# Patient Record
Sex: MALE | Race: ASIAN | NOT HISPANIC OR LATINO | Employment: FULL TIME | URBAN - METROPOLITAN AREA
[De-identification: names, ages, dates, MRNs, and addresses within clinical notes are randomized per-mention and may not be internally consistent; named-entity substitution may affect disease eponyms.]

---

## 2017-01-03 ENCOUNTER — GENERIC CONVERSION - ENCOUNTER (OUTPATIENT)
Dept: OTHER | Facility: OTHER | Age: 47
End: 2017-01-03

## 2017-01-05 ENCOUNTER — ALLSCRIPTS OFFICE VISIT (OUTPATIENT)
Dept: OTHER | Facility: OTHER | Age: 47
End: 2017-01-05

## 2017-01-06 ENCOUNTER — LAB CONVERSION - ENCOUNTER (OUTPATIENT)
Dept: OTHER | Facility: OTHER | Age: 47
End: 2017-01-06

## 2017-01-06 LAB
A/G RATIO (HISTORICAL): 1.6 (CALC) (ref 1–2.5)
ALBUMIN SERPL BCP-MCNC: 4.7 G/DL (ref 3.6–5.1)
ALP SERPL-CCNC: 51 U/L (ref 40–115)
ALT SERPL W P-5'-P-CCNC: 26 U/L (ref 9–46)
AST SERPL W P-5'-P-CCNC: 25 U/L (ref 10–40)
BASOPHILS # BLD AUTO: 0.4 %
BASOPHILS # BLD AUTO: 20 CELLS/UL (ref 0–200)
BILIRUB SERPL-MCNC: 0.6 MG/DL (ref 0.2–1.2)
BUN SERPL-MCNC: 19 MG/DL (ref 7–25)
BUN/CREA RATIO (HISTORICAL): NORMAL (CALC) (ref 6–22)
CALCIUM SERPL-MCNC: 9.3 MG/DL (ref 8.6–10.3)
CHLORIDE SERPL-SCNC: 100 MMOL/L (ref 98–110)
CHOLEST SERPL-MCNC: 295 MG/DL (ref 125–200)
CHOLEST/HDLC SERPL: 4.6 (CALC)
CO2 SERPL-SCNC: 25 MMOL/L (ref 20–31)
CREAT SERPL-MCNC: 1.08 MG/DL (ref 0.6–1.35)
DEPRECATED RDW RBC AUTO: 12.1 % (ref 11–15)
EGFR AFRICAN AMERICAN (HISTORICAL): 95 ML/MIN/1.73M2
EGFR-AMERICAN CALC (HISTORICAL): 82 ML/MIN/1.73M2
EOSINOPHIL # BLD AUTO: 145 CELLS/UL (ref 15–500)
EOSINOPHIL # BLD AUTO: 2.9 %
FOLATE SERPL-MCNC: 20.9 NG/ML
GAMMA GLOBULIN (HISTORICAL): 3 G/DL (CALC) (ref 1.9–3.7)
GLUCOSE (HISTORICAL): 81 MG/DL (ref 65–99)
HCT VFR BLD AUTO: 47.3 % (ref 38.5–50)
HDLC SERPL-MCNC: 64 MG/DL
HGB BLD-MCNC: 15.8 G/DL (ref 13.2–17.1)
LDL CHOLESTEROL (HISTORICAL): 200 MG/DL (CALC)
LYMPHOCYTES # BLD AUTO: 1855 CELLS/UL (ref 850–3900)
LYMPHOCYTES # BLD AUTO: 37.1 %
MCH RBC QN AUTO: 32.3 PG (ref 27–33)
MCHC RBC AUTO-ENTMCNC: 33.3 G/DL (ref 32–36)
MCV RBC AUTO: 97 FL (ref 80–100)
MONOCYTES # BLD AUTO: 445 CELLS/UL (ref 200–950)
MONOCYTES (HISTORICAL): 8.9 %
NEUTROPHILS # BLD AUTO: 2535 CELLS/UL (ref 1500–7800)
NEUTROPHILS # BLD AUTO: 50.7 %
NON-HDL-CHOL (CHOL-HDL) (HISTORICAL): 231 MG/DL (CALC)
PLATELET # BLD AUTO: 215 THOUSAND/UL (ref 140–400)
PMV BLD AUTO: 9.1 FL (ref 7.5–11.5)
POTASSIUM SERPL-SCNC: 4 MMOL/L (ref 3.5–5.3)
RBC # BLD AUTO: 4.88 MILLION/UL (ref 4.2–5.8)
SODIUM SERPL-SCNC: 137 MMOL/L (ref 135–146)
T4 FREE SERPL-MCNC: 1.3 NG/DL (ref 0.8–1.8)
TOTAL PROTEIN (HISTORICAL): 7.7 G/DL (ref 6.1–8.1)
TRIGL SERPL-MCNC: 154 MG/DL
TSH SERPL DL<=0.05 MIU/L-ACNC: 3.43 MIU/L (ref 0.4–4.5)
VIT B12 SERPL-MCNC: 580 PG/ML (ref 200–1100)
WBC # BLD AUTO: 5 THOUSAND/UL (ref 3.8–10.8)

## 2017-02-06 ENCOUNTER — ALLSCRIPTS OFFICE VISIT (OUTPATIENT)
Dept: OTHER | Facility: OTHER | Age: 47
End: 2017-02-06

## 2018-01-11 NOTE — PROGRESS NOTES
Assessment    1  Allergic rhinitis, unspecified allergic rhinitis trigger, unspecified rhinitis seasonality (477 9)   (J30 9)   2  Palpitations (785 1) (R00 2)   3  Anxiety (300 00) (F41 9)   4  Hyperlipidemia (272 4) (E78 5)   5  Encounter for preventive health examination (V70 0) (Z00 00)    Plan  Seasonal allergic rhinitis, unspecified allergic rhinitis trigger    · Fluticasone Propionate 50 MCG/ACT Nasal Suspension; USE 2 SPRAYS IN EACH  NOSTRIL ONCE DAILY    Discussion/Summary  Impression: health maintenance visit  Currently, he eats a healthy diet, eats an adequate diet and has an adequate exercise regimen  Prostate cancer screening: PSA is not indicated  Testicular cancer screening: clinical testicular exam was done today  Colorectal cancer screening: colorectal cancer screening is not indicated  Screening lab work includes glucose and lipid profile  Patient discussion: discussed with the patient  Discussed blood test results  Patient to try Fluticasone NS, and continue OTC Claritin for allergies  Discussed stress and stress management, info given on recommendations  RTO after repeating blood test in 6 months, or sooner as needed  Self Referrals: No      Chief Complaint  Pt here for HM today  No depression sx--form ordered  dk    nsr since last here  History of Present Illness  HPI: 55year old  male presents for PE and to discuss blood test results  Dad had very high cholesterol  Review of Systems    Constitutional: no fever, not feeling poorly, no chills and not feeling tired  Eyes: no eyesight problems  ENT: no earache, no sore throat and no nasal discharge  Cardiovascular: palpitations, but no chest pain  Respiratory: shortness of breath, but no cough  Gastrointestinal: no abdominal pain, no nausea, no vomiting, no constipation, no diarrhea and no blood in stools  Musculoskeletal: no arthralgias and no myalgias     Neurological: no headache, no numbness, no tingling, no dizziness and no fainting  Psychiatric: anxiety and Able to control stress  , but not suicidal, no sleep disturbances and no depression  Over the past 2 weeks, how often have you been bothered by the following problems? 1 ) Little interest or pleasure in doing things? Not at all    2 ) Feeling down, depressed or hopeless? Not at all    3 ) Trouble falling asleep or sleeping too much? Half the days or more  4 ) Feeling tired or having little energy? Not at all    5 ) Poor appetite or overeating? Half the days or more  6 ) Feeling bad about yourself, or that you are a failure, or have let yourself or your family down? Several days  7 ) Trouble concentrating on things, such as reading a newspaper or watching television? Not at all    8 ) Moving or speaking so slowly that other people could have noticed, or the opposite, moving or speaking faster than usual? Not at all    9 ) Thoughts that you would be better off dead or of hurting yourself in some way? Not at all  severity of depression is mild   Score 5      Active Problems    1  Myopathy (359 9) (G72 9)   2  Palpitations (785 1) (R00 2)   3  Right shoulder pain (719 41) (M25 511)    Past Medical History    · History of hyperlipidemia (V12 29) (Z86 39)    Surgical History    · History of Elbow Surgery    Family History  Mother    · Family history unremarkable  Father    · Family history unremarkable    Social History    · Alcohol use (V49 89) (Z78 9)   · Current every day smoker (305 1) (F17 200)    Current Meds   1  Claritin 10 MG Oral Tablet; TAKE 1 TABLET DAILY; Therapy: (Recorded:[id number]) to Recorded    Allergies    1  No Known Drug Allergies    2  Pollen   3   Trees    Vitals   Recorded: [id number] 03:48PM   Heart Rate 68   Respiration 16   Systolic 267, LUE, Sitting   Diastolic 78, LUE, Sitting   Height 5 ft 5 in   Weight 148 lb    BMI Calculated 24 63   BSA Calculated 1 74     Physical Exam    Constitutional   General appearance: No acute distress, well appearing and well nourished  Head and Face   Head and face: Normal     Eyes   Conjunctiva and lids: No erythema, swelling or discharge  Pupils and irises: Equal, round, reactive to light  Ears, Nose, Mouth, and Throat   External inspection of ears and nose: Normal     Otoscopic examination: Tympanic membranes translucent with normal light reflex  Canals patent without erythema  Hearing: Normal     Nasal mucosa, septum, and turbinates: Abnormal   Turbinates inflamed  Lips, teeth, and gums: Normal, good dentition  Oropharynx: Normal with no erythema, edema, exudate or lesions  Neck   Neck: Supple, symmetric, trachea midline, no masses  Thyroid: Normal, no thyromegaly  Pulmonary   Respiratory effort: No increased work of breathing or signs of respiratory distress  Auscultation of lungs: Clear to auscultation  Cardiovascular   Auscultation of heart: Normal rate and rhythm, normal S1 and S2, no murmurs  Pedal pulses: 2+ bilaterally  Peripheral vascular exam: Normal     Examination of extremities for edema and/or varicosities: Normal     Chest   Breasts: Normal, no dimpling or skin changes appreciated  Abdomen   Abdomen: Non-tender, no masses  Liver and spleen: No hepatomegaly or splenomegaly  Examination for hernias: No hernias appreciated  Genitourinary   Scrotal contents: Normal testes, no masses  Penis: Normal, no lesions  Lymphatic   Palpation of lymph nodes in neck: No lymphadenopathy  Palpation of lymph nodes in groin: No lymphadenopathy  Musculoskeletal   Gait and station: Normal     Inspection/palpation of digits and nails: Normal without clubbing or cyanosis  Inspection/palpation of joints, bones, and muscles: Normal     Range of motion: Normal     Stability: Normal     Muscle strength/tone: Normal     Psychiatric   Judgment and insight: Normal     Orientation to person, place and time: Normal     Recent and remote memory: Intact  Mood and affect: Normal   Calm, stable, cooperative  Results/Data  PHQ-2 Adult Depression Screening [id number] 03:57PM User, Ahs     Test Name Result Flag Reference   PHQ-2 Adult Depression Score 0     Over the last two weeks, how often have you been bothered by any of the following problems?   Little interest or pleasure in doing things: Not at all - 0  Feeling down, depressed, or hopeless: Not at all - 0   PHQ-2 Adult Depression Screening Negative         Signatures   Electronically signed by : Torri Torres [facility]; Feb 6 2017  4:24PM EST                       (Author)    Electronically signed by : Dianne Churchill DO; Feb 6 2017  4:46PM EST                       (Author)

## 2018-01-12 VITALS
DIASTOLIC BLOOD PRESSURE: 80 MMHG | HEIGHT: 65 IN | RESPIRATION RATE: 16 BRPM | BODY MASS INDEX: 24.24 KG/M2 | HEART RATE: 80 BPM | SYSTOLIC BLOOD PRESSURE: 120 MMHG | WEIGHT: 145.5 LBS

## 2018-01-12 NOTE — PROGRESS NOTES
Assessment    1  Strain of musc/fasc/tend prt biceps, right arm, init (840 8) (S4 211A)    Plan  Right shoulder pain    · * XR SHOULDER 2+ VIEW RIGHT; Status:Active; Requested [id number];   Strain of musc/fasc/tend prt biceps, right arm, init    · Follow-up PRN Evaluation and Treatment  Follow-up  Status: Complete  Done:  [id number]    Discussion/Summary    59-year-old male with a right anterior arm and muscle strain  I discussed the diagnosis with him  I discussed expected recovery  We spoke about gradual return to activity and the importance of stretching and range of motion  I see no evidence today of a complete muscle tear or tendon retraction or tear  If after 4-6 weeks he's failed to make significant improvement, I will see him back  This documentation was recorded using voice recognition software and errors may be noted  Chief Complaint  Right arm injury      History of Present Illness  This is a 59-year-old male who was doing pull-ups a week ago, and when he was fully extended he felt a ripping sensation in the anterior medial aspect of his arm  He had bruising and ecchymosis that wrapped around the front of the arm afterwards  He's had stiffness and soreness  He has been taking easy and not been trying to do significant lifting  He has been doing P90 X recently and has noticed that his body never feels that it has a chance to fully recover  He is left-hand dominant  Review of Systems    Constitutional: No fever or chills, feels well, no tiredness, no recent weight loss or weight gain  Eyes: No complaints of red eyes, no eyesight problems  ENT: no complaints of loss of hearing, no nosebleeds, no sore throat  Cardiovascular: No complaints of chest pain, no palpitations, no leg claudication or lower extremity edema  Respiratory: No complaints of shortness of breath, no wheezing, no cough     Gastrointestinal: No complaints of abdominal pain, no constipation, no nausea or vomiting, no diarrhea or bloody stools  Genitourinary: No complaints of dysuria or incontinence, no hesitancy, no nocturia  Musculoskeletal: as noted in HPI  Integumentary: No complaints of skin rash or lesion, no itching or dry skin, no skin wounds  Neurological: No complaints of headache, no confusion, no numbness or tingling, no dizziness  Psychiatric: No suicidal thoughts, no anxiety, no depression  Endocrine: No muscle weakness, no frequent urination, no excessive thirst, no feelings of weakness  ROS reviewed  Active Problems    1  Right shoulder pain (719 41) (M25 511)    Past Medical History    The active problems and past medical history were reviewed and updated today  Surgical History    The surgical history was reviewed and updated today  Family History    The family history was reviewed and updated today  Social History    · Alcohol use (V49 89) (F10 99)   · Current every day smoker (305 1) (F17 200)  The social history was reviewed and updated today  Current Meds   1  No Reported Medications Recorded    The medication list was reviewed and updated today  Allergies    1  No Known Drug Allergies    Vitals   Recorded: [id number] 03:16PM   Heart Rate 504   Systolic 970   Diastolic 83   Height 5 ft 3 in   Weight 145 lb    BMI Calculated 25 69   BSA Calculated 1 69     Physical Exam    Right Shoulder: Appearance: Ecchymosis anterior medially, wrapping around medially and distally  Palpation along the medial border of the biceps wrapping around anteriorly  ROM: equivalent both sides  Motor: Normal Full strength with pronation and supination  Special Tests: negative Painful Arc, negative Eaton test, negative Neer test, negative Drop Arm test, negative Rizvi's test, negative Cross Body Adduction test and negative Speed's test       Results/Data  I personally reviewed the films/images/results in the office today  My interpretation follows     X-ray Review X-rays of the right shoulder are reviewed  No fractures or dislocations are noted  Joint spaces are well-maintained        Signatures   Electronically signed by : Fany Young MD; Jan 28 2016  3:39PM EST                       (Author)

## 2018-01-15 VITALS
BODY MASS INDEX: 24.66 KG/M2 | HEART RATE: 68 BPM | WEIGHT: 148 LBS | DIASTOLIC BLOOD PRESSURE: 78 MMHG | RESPIRATION RATE: 16 BRPM | HEIGHT: 65 IN | SYSTOLIC BLOOD PRESSURE: 110 MMHG

## 2018-02-06 ENCOUNTER — TELEPHONE (OUTPATIENT)
Dept: FAMILY MEDICINE CLINIC | Facility: HOSPITAL | Age: 48
End: 2018-02-06

## 2018-06-07 ENCOUNTER — TELEPHONE (OUTPATIENT)
Dept: FAMILY MEDICINE CLINIC | Facility: HOSPITAL | Age: 48
End: 2018-06-07

## 2018-06-08 NOTE — TELEPHONE ENCOUNTER
They need office visit for physical with Dr Antonella Darnell and he can make the judgement for any screening test

## 2018-12-24 ENCOUNTER — OFFICE VISIT (OUTPATIENT)
Dept: FAMILY MEDICINE CLINIC | Facility: HOSPITAL | Age: 48
End: 2018-12-24
Payer: COMMERCIAL

## 2018-12-24 VITALS
HEIGHT: 65 IN | BODY MASS INDEX: 24.83 KG/M2 | HEART RATE: 72 BPM | WEIGHT: 149 LBS | DIASTOLIC BLOOD PRESSURE: 72 MMHG | RESPIRATION RATE: 16 BRPM | SYSTOLIC BLOOD PRESSURE: 110 MMHG

## 2018-12-24 DIAGNOSIS — Z86.39 H/O MIXED HYPERLIPIDEMIA: ICD-10-CM

## 2018-12-24 DIAGNOSIS — Z00.00 HEALTHCARE MAINTENANCE: ICD-10-CM

## 2018-12-24 DIAGNOSIS — R53.82 CHRONIC FATIGUE: ICD-10-CM

## 2018-12-24 DIAGNOSIS — Z82.49 FAMILY HX OF AORTIC ANEURYSM: Primary | ICD-10-CM

## 2018-12-24 PROCEDURE — 99396 PREV VISIT EST AGE 40-64: CPT | Performed by: PHYSICIAN ASSISTANT

## 2018-12-24 RX ORDER — LORATADINE 10 MG/1
1 TABLET ORAL DAILY
COMMUNITY
End: 2018-12-24 | Stop reason: ALTCHOICE

## 2018-12-24 RX ORDER — CETIRIZINE HYDROCHLORIDE 10 MG/1
10 TABLET ORAL DAILY
COMMUNITY

## 2018-12-24 NOTE — PROGRESS NOTES
Assessment/Plan:         Diagnoses and all orders for this visit:    Family hx of aortic aneurysm  -     US abdominal aorta; Future  -     Testosterone, free, total; Future  -     CBC and differential; Future  -     Comprehensive metabolic panel; Future  -     Lipid panel; Future  -     PSA, Total Screen; Future  -     TSH, 3rd generation with Free T4 reflex; Future    H/O mixed hyperlipidemia  -     Testosterone, free, total; Future  -     CBC and differential; Future  -     Comprehensive metabolic panel; Future  -     Lipid panel; Future  -     PSA, Total Screen; Future  -     TSH, 3rd generation with Free T4 reflex; Future      Healthcare maintenance  -     Testosterone, free, total; Future  -     CBC and differential; Future  -     Comprehensive metabolic panel; Future  -     Lipid panel; Future  -     PSA, Total Screen; Future  -     TSH, 3rd generation with Free T4 reflex; Future    -     cetirizine (ZyrTEC) 10 mg tablet; Take 10 mg y mouth daily        Subjective:      Patient ID: Hugo Rodriguez is a 50 y o  male  50year old  male presents for physical   Would like test to screen for AAA, and low testosterone  Has occasional allergy sx , and blurry vision  Review of Systems   Constitutional: Negative for appetite change, chills, diaphoresis, fatigue and fever  HENT: Positive for congestion  Negative for ear pain, rhinorrhea and sore throat  Has allergic rhinitis, all year long  Eyes: Positive for visual disturbance  Negative for pain  Respiratory: Negative for cough, chest tightness and shortness of breath  Gastrointestinal: Negative for abdominal pain, blood in stool, constipation, diarrhea, nausea and vomiting  Endocrine: Negative for polydipsia, polyphagia and polyuria  Genitourinary: Negative for dysuria, frequency, hematuria, penile pain, penile swelling, scrotal swelling and testicular pain     Musculoskeletal: Negative for arthralgias, back pain, myalgias, neck pain and neck stiffness  Neurological: Negative for dizziness, tremors, weakness, light-headedness, numbness and headaches  Psychiatric/Behavioral: Negative for agitation, dysphoric mood, self-injury, sleep disturbance and suicidal ideas  The patient is not nervous/anxious  Objective:      /72   Pulse 72   Resp 16   Ht 5' 5" (1 651 m)   Wt 67 6 kg (149 lb)   BMI 24 79 kg/m²          Physical Exam   Constitutional: He is oriented to person, place, and time  He appears well-developed and well-nourished  No distress  HENT:   Head: Normocephalic and atraumatic  Right Ear: External ear normal    Left Ear: External ear normal    Mouth/Throat: Oropharynx is clear and moist  No oropharyngeal exudate  Turbinates inflamed  Eyes: Conjunctivae and EOM are normal  Right eye exhibits no discharge  Left eye exhibits no discharge  No scleral icterus  Neck: Neck supple  Pulmonary/Chest: Effort normal and breath sounds normal  No respiratory distress  He has no wheezes  He has no rales  He exhibits no tenderness  Abdominal: Soft  Bowel sounds are normal  He exhibits no distension and no mass  There is no tenderness  There is no rebound and no guarding  Genitourinary: Penis normal  No penile tenderness  Musculoskeletal: Normal range of motion  He exhibits no edema or tenderness  Neurological: He is alert and oriented to person, place, and time  Coordination normal    Skin: He is not diaphoretic  Psychiatric: He has a normal mood and affect  His behavior is normal  Judgment and thought content normal    Nursing note reviewed

## 2018-12-24 NOTE — PATIENT INSTRUCTIONS
Given orders for complete blood test, and AAA screening  Recommend using otc Flonase    Recommend getting eye exam

## 2019-01-14 ENCOUNTER — HOSPITAL ENCOUNTER (OUTPATIENT)
Dept: ULTRASOUND IMAGING | Facility: HOSPITAL | Age: 49
Discharge: HOME | End: 2019-01-14
Payer: COMMERCIAL

## 2019-01-14 DIAGNOSIS — Z82.49 FAMILY HX OF AORTIC ANEURYSM: ICD-10-CM

## 2019-01-14 PROCEDURE — 76775 US EXAM ABDO BACK WALL LIM: CPT

## 2019-01-16 ENCOUNTER — OFFICE VISIT (OUTPATIENT)
Dept: FAMILY MEDICINE CLINIC | Facility: HOSPITAL | Age: 49
End: 2019-01-16
Payer: COMMERCIAL

## 2019-01-16 VITALS
HEART RATE: 74 BPM | WEIGHT: 149 LBS | RESPIRATION RATE: 16 BRPM | DIASTOLIC BLOOD PRESSURE: 84 MMHG | BODY MASS INDEX: 24.83 KG/M2 | HEIGHT: 65 IN | SYSTOLIC BLOOD PRESSURE: 122 MMHG

## 2019-01-16 DIAGNOSIS — Z86.39 H/O MIXED HYPERLIPIDEMIA: Primary | ICD-10-CM

## 2019-01-16 DIAGNOSIS — E78.2 ELEVATED TRIGLYCERIDES WITH HIGH CHOLESTEROL: ICD-10-CM

## 2019-01-16 LAB
ALBUMIN SERPL-MCNC: 4.7 G/DL (ref 3.5–5.5)
ALBUMIN/GLOB SERPL: 1.7 {RATIO} (ref 1.2–2.2)
ALP SERPL-CCNC: 75 IU/L (ref 39–117)
ALT SERPL-CCNC: 39 IU/L (ref 0–44)
AST SERPL-CCNC: 31 IU/L (ref 0–40)
BASOPHILS # BLD AUTO: 0 X10E3/UL (ref 0–0.2)
BASOPHILS NFR BLD AUTO: 1 %
BILIRUB SERPL-MCNC: 0.4 MG/DL (ref 0–1.2)
BUN SERPL-MCNC: 17 MG/DL (ref 6–24)
BUN/CREAT SERPL: 18 (ref 9–20)
CALCIUM SERPL-MCNC: 9.3 MG/DL (ref 8.7–10.2)
CHLORIDE SERPL-SCNC: 98 MMOL/L (ref 96–106)
CHOLEST SERPL-MCNC: 334 MG/DL (ref 100–199)
CO2 SERPL-SCNC: 25 MMOL/L (ref 20–29)
CREAT SERPL-MCNC: 0.95 MG/DL (ref 0.76–1.27)
EOSINOPHIL # BLD AUTO: 0.2 X10E3/UL (ref 0–0.4)
EOSINOPHIL NFR BLD AUTO: 4 %
ERYTHROCYTE [DISTWIDTH] IN BLOOD BY AUTOMATED COUNT: 13.1 % (ref 12.3–15.4)
GLOBULIN SER-MCNC: 2.8 G/DL (ref 1.5–4.5)
GLUCOSE SERPL-MCNC: 93 MG/DL (ref 65–99)
HCT VFR BLD AUTO: 46.6 % (ref 37.5–51)
HDLC SERPL-MCNC: 47 MG/DL
HGB BLD-MCNC: 16.2 G/DL (ref 13–17.7)
IMM GRANULOCYTES # BLD: 0 X10E3/UL (ref 0–0.1)
IMM GRANULOCYTES NFR BLD: 0 %
LABCORP COMMENT: NORMAL
LDLC SERPL CALC-MCNC: ABNORMAL MG/DL (ref 0–99)
LYMPHOCYTES # BLD AUTO: 2 X10E3/UL (ref 0.7–3.1)
LYMPHOCYTES NFR BLD AUTO: 32 %
MCH RBC QN AUTO: 33.6 PG (ref 26.6–33)
MCHC RBC AUTO-ENTMCNC: 34.8 G/DL (ref 31.5–35.7)
MCV RBC AUTO: 97 FL (ref 79–97)
MONOCYTES # BLD AUTO: 0.5 X10E3/UL (ref 0.1–0.9)
MONOCYTES NFR BLD AUTO: 9 %
NEUTROPHILS # BLD AUTO: 3.4 X10E3/UL (ref 1.4–7)
NEUTROPHILS NFR BLD AUTO: 54 %
PLATELET # BLD AUTO: 240 X10E3/UL (ref 150–379)
POTASSIUM SERPL-SCNC: 4.8 MMOL/L (ref 3.5–5.2)
PROT SERPL-MCNC: 7.5 G/DL (ref 6–8.5)
PSA SERPL-MCNC: 0.5 NG/ML (ref 0–4)
RBC # BLD AUTO: 4.82 X10E6/UL (ref 4.14–5.8)
SL AMB EGFR AFRICAN AMERICAN: 109 ML/MIN/1.73
SL AMB EGFR NON AFRICAN AMERICAN: 94 ML/MIN/1.73
SL AMB VLDL CHOLESTEROL CALC: ABNORMAL MG/DL (ref 5–40)
SODIUM SERPL-SCNC: 139 MMOL/L (ref 134–144)
TESTOST FREE SERPL-MCNC: 10 PG/ML (ref 6.8–21.5)
TESTOST SERPL-MCNC: 316 NG/DL (ref 264–916)
TRIGL SERPL-MCNC: 837 MG/DL (ref 0–149)
TSH SERPL DL<=0.005 MIU/L-ACNC: 2.71 UIU/ML (ref 0.45–4.5)
WBC # BLD AUTO: 6.2 X10E3/UL (ref 3.4–10.8)

## 2019-01-16 PROCEDURE — 99213 OFFICE O/P EST LOW 20 MIN: CPT | Performed by: PHYSICIAN ASSISTANT

## 2019-01-16 PROCEDURE — 3008F BODY MASS INDEX DOCD: CPT | Performed by: PHYSICIAN ASSISTANT

## 2019-01-16 RX ORDER — ATORVASTATIN CALCIUM 20 MG/1
20 TABLET, FILM COATED ORAL DAILY
Qty: 30 TABLET | Refills: 3 | Status: SHIPPED | OUTPATIENT
Start: 2019-01-16 | End: 2019-06-10 | Stop reason: SDUPTHER

## 2019-01-16 NOTE — PROGRESS NOTES
Assessment/Plan:         Diagnoses and all orders for this visit:    H/O mixed hyperlipidemia  -     atorvastatin (LIPITOR) 20 mg tablet; Take 1 tablet (20 mg total) by mouth daily  -     Comprehensive metabolic panel; Future  -     Lipid panel; Future  -     Hemoglobin A1C; Future    Elevated triglycerides with high cholesterol  -     Comprehensive metabolic panel; Future  -     Lipid panel; Future  -     Hemoglobin A1C; Future        Subjective:      Patient ID: Luke Weems is a 50 y o  male  50year old  male presents to discuss results of labs  Admits to drinking 2 glasses of wine daily, on average; Except had more alcohol day prior to lab draw, due to football game, Saturday  Cut down on alcohol about 10 years ago  Has no sex drive, and has trouble losing fat in abd  Area  Dad had elevated cholesterol  Review of Systems   Constitutional: Negative for fatigue  Respiratory: Negative for cough and shortness of breath  Gastrointestinal: Negative for abdominal pain, constipation, diarrhea, nausea and vomiting  Endocrine: Negative for polydipsia, polyphagia and polyuria  Musculoskeletal: Negative for back pain, neck pain and neck stiffness  Neurological: Negative for dizziness, tremors, weakness, light-headedness, numbness and headaches  Objective:      /84   Pulse 74   Resp 16   Ht 5' 5" (1 651 m)   Wt 67 6 kg (149 lb)   BMI 24 79 kg/m²          Physical Exam   Constitutional: He is oriented to person, place, and time  He appears well-developed and well-nourished  No distress  HENT:   Head: Normocephalic and atraumatic  Eyes: Conjunctivae and EOM are normal  Right eye exhibits no discharge  Left eye exhibits no discharge  No scleral icterus  Cardiovascular: Normal rate, regular rhythm and normal heart sounds  Pulmonary/Chest: Effort normal and breath sounds normal  No respiratory distress  He has no wheezes  He has no rales     Musculoskeletal: He exhibits no edema  Neurological: He is alert and oriented to person, place, and time  Skin: He is not diaphoretic  Psychiatric: He has a normal mood and affect  His behavior is normal  Judgment and thought content normal    Nursing note and vitals reviewed

## 2019-01-16 NOTE — PATIENT INSTRUCTIONS
Discussed meal planning, and exercise  Recommend starting Atorvastatin 20 mg  Qhs, for 3 months then repeating lipids, comp chem, and added HgA1C

## 2019-03-13 ENCOUNTER — TELEPHONE (OUTPATIENT)
Dept: FAMILY MEDICINE CLINIC | Facility: HOSPITAL | Age: 49
End: 2019-03-13

## 2019-06-08 LAB
ALBUMIN SERPL-MCNC: 4.8 G/DL (ref 3.5–5.5)
ALBUMIN/GLOB SERPL: 1.7 {RATIO} (ref 1.2–2.2)
ALP SERPL-CCNC: 61 IU/L (ref 39–117)
ALT SERPL-CCNC: 22 IU/L (ref 0–44)
AST SERPL-CCNC: 29 IU/L (ref 0–40)
BILIRUB SERPL-MCNC: 0.6 MG/DL (ref 0–1.2)
BUN SERPL-MCNC: 13 MG/DL (ref 6–24)
BUN/CREAT SERPL: 12 (ref 9–20)
CALCIUM SERPL-MCNC: 9.8 MG/DL (ref 8.7–10.2)
CHLORIDE SERPL-SCNC: 99 MMOL/L (ref 96–106)
CHOLEST SERPL-MCNC: 313 MG/DL (ref 100–199)
CO2 SERPL-SCNC: 24 MMOL/L (ref 20–29)
CREAT SERPL-MCNC: 1.09 MG/DL (ref 0.76–1.27)
GLOBULIN SER-MCNC: 2.8 G/DL (ref 1.5–4.5)
GLUCOSE SERPL-MCNC: 91 MG/DL (ref 65–99)
HBA1C MFR BLD: 5.4 % (ref 4.8–5.6)
HDLC SERPL-MCNC: 61 MG/DL
LABCORP COMMENT: NORMAL
LDLC SERPL CALC-MCNC: 231 MG/DL (ref 0–99)
POTASSIUM SERPL-SCNC: 4.1 MMOL/L (ref 3.5–5.2)
PROT SERPL-MCNC: 7.6 G/DL (ref 6–8.5)
SL AMB EGFR AFRICAN AMERICAN: 92 ML/MIN/1.73
SL AMB EGFR NON AFRICAN AMERICAN: 80 ML/MIN/1.73
SL AMB VLDL CHOLESTEROL CALC: 21 MG/DL (ref 5–40)
SODIUM SERPL-SCNC: 139 MMOL/L (ref 134–144)
TRIGL SERPL-MCNC: 107 MG/DL (ref 0–149)

## 2019-06-10 DIAGNOSIS — Z86.39 H/O MIXED HYPERLIPIDEMIA: ICD-10-CM

## 2019-06-10 RX ORDER — ATORVASTATIN CALCIUM 20 MG/1
20 TABLET, FILM COATED ORAL DAILY
Qty: 30 TABLET | Refills: 3 | Status: SHIPPED | OUTPATIENT
Start: 2019-06-10 | End: 2019-09-06

## 2019-09-06 ENCOUNTER — OFFICE VISIT (OUTPATIENT)
Dept: FAMILY MEDICINE CLINIC | Facility: HOSPITAL | Age: 49
End: 2019-09-06
Payer: COMMERCIAL

## 2019-09-06 ENCOUNTER — HOSPITAL ENCOUNTER (OUTPATIENT)
Dept: RADIOLOGY | Facility: HOSPITAL | Age: 49
Discharge: HOME | End: 2019-09-06
Payer: COMMERCIAL

## 2019-09-06 VITALS
DIASTOLIC BLOOD PRESSURE: 72 MMHG | WEIGHT: 147 LBS | HEART RATE: 97 BPM | HEIGHT: 65 IN | BODY MASS INDEX: 24.49 KG/M2 | SYSTOLIC BLOOD PRESSURE: 106 MMHG

## 2019-09-06 DIAGNOSIS — M25.571 ARTHRALGIA OF RIGHT FOOT: ICD-10-CM

## 2019-09-06 DIAGNOSIS — M25.40 JOINT SWELLING: ICD-10-CM

## 2019-09-06 DIAGNOSIS — M10.9 ACUTE GOUT INVOLVING TOE OF RIGHT FOOT, UNSPECIFIED CAUSE: ICD-10-CM

## 2019-09-06 DIAGNOSIS — Z86.39 H/O MIXED HYPERLIPIDEMIA: ICD-10-CM

## 2019-09-06 DIAGNOSIS — Z30.09 VASECTOMY EVALUATION: Primary | ICD-10-CM

## 2019-09-06 PROCEDURE — 3008F BODY MASS INDEX DOCD: CPT | Performed by: PHYSICIAN ASSISTANT

## 2019-09-06 PROCEDURE — 99213 OFFICE O/P EST LOW 20 MIN: CPT | Performed by: PHYSICIAN ASSISTANT

## 2019-09-06 PROCEDURE — 73630 X-RAY EXAM OF FOOT: CPT

## 2019-09-06 RX ORDER — ATORVASTATIN CALCIUM 10 MG/1
10 TABLET, FILM COATED ORAL EVERY OTHER DAY
Qty: 30 TABLET | Refills: 3 | Status: SHIPPED | OUTPATIENT
Start: 2019-09-06

## 2019-09-06 RX ORDER — INDOMETHACIN 75 MG/1
75 CAPSULE, EXTENDED RELEASE ORAL 2 TIMES DAILY WITH MEALS
Qty: 60 CAPSULE | Refills: 3 | Status: SHIPPED | OUTPATIENT
Start: 2019-09-06

## 2019-09-06 RX ORDER — COLCHICINE 0.6 MG/1
0.6 TABLET ORAL DAILY
Qty: 30 TABLET | Refills: 5 | Status: SHIPPED | OUTPATIENT
Start: 2019-09-06

## 2019-09-06 RX ORDER — ATORVASTATIN CALCIUM 20 MG/1
10 TABLET, FILM COATED ORAL EVERY OTHER DAY
Qty: 30 TABLET | Refills: 3 | Status: SHIPPED | OUTPATIENT
Start: 2019-09-06 | End: 2019-09-06 | Stop reason: SDUPTHER

## 2019-09-06 RX ORDER — ALLOPURINOL 100 MG/1
200 TABLET ORAL DAILY
Qty: 60 TABLET | Refills: 5 | Status: SHIPPED | OUTPATIENT
Start: 2019-09-06 | End: 2020-03-09

## 2019-09-06 NOTE — PATIENT INSTRUCTIONS
Recommend Indocin bid, colchicine qd, and allopurinol bid  Sent to get foot xray and uric acid levels  Info given on gout, patient to avoid smoking and alcohol use especially during attack  Increase water intake

## 2019-09-06 NOTE — PROGRESS NOTES
Assessment/Plan:         Diagnoses and all orders for this visit:    Vasectomy evaluation  -     Ambulatory referral to Urology; Future    Arthralgia of right foot  -     XR foot 3+ vw right; Future  -     Uric acid; Future  -     allopurinol (ZYLOPRIM) 100 mg tablet; Take 2 tablets (200 mg total) by mouth daily  -     colchicine (COLCRYS) 0 6 mg tablet; Take 1 tablet (0 6 mg total) by mouth daily  -     indomethacin (INDOCIN SR) 75 mg CR capsule; Take 1 capsule (75 mg total) by mouth 2 (two) times a day with meals    Joint swelling  -     XR foot 3+ vw right; Future  -     Uric acid; Future  -     allopurinol (ZYLOPRIM) 100 mg tablet; Take 2 tablets (200 mg total) by mouth daily  -     colchicine (COLCRYS) 0 6 mg tablet; Take 1 tablet (0 6 mg total) by mouth daily  -     indomethacin (INDOCIN SR) 75 mg CR capsule; Take 1 capsule (75 mg total) by mouth 2 (two) times a day with meals    Acute gout involving toe of right foot, unspecified cause  -     Uric acid; Future  -     allopurinol (ZYLOPRIM) 100 mg tablet; Take 2 tablets (200 mg total) by mouth daily  -     colchicine (COLCRYS) 0 6 mg tablet; Take 1 tablet (0 6 mg total) by mouth daily  -     indomethacin (INDOCIN SR) 75 mg CR capsule; Take 1 capsule (75 mg total) by mouth 2 (two) times a day with meals    H/O mixed hyperlipidemia  -     atorvastatin (LIPITOR) 20 mg tablet; Take 0 5 tablets (10 mg total) by mouth every other day        Subjective:      Patient ID: Cherelle Escoto is a 50 y o  male  50year old  male c/o middle toe pain, and swelling,  right foot, past 2 week; suspects has gout  Sometimes left great toe, or middle toes  Joint pain has been more frequent, weekly  Feels like there is a marble in joint, worse when walking     Has had pain in great toe in the past, was seen in urgent care for anti inflammatory treatment in the past    Smokes cigars- one cigar daily; weekend alcohol use-  5-10 hard liquor and wine, total for the weekend; no drug use  Taking vitamin D 5000 IU daily  Took Ibuprofen for pain, with no relief  Review of Systems   Constitutional: Negative for chills, diaphoresis, fatigue and fever  Respiratory: Negative for cough and shortness of breath  Musculoskeletal: Positive for arthralgias and joint swelling  Negative for back pain, myalgias, neck pain and neck stiffness  Neurological: Negative for dizziness, light-headedness, numbness and headaches  Objective:      /72   Pulse 97   Ht 5' 5" (1 651 m)   Wt 66 7 kg (147 lb)   BMI 24 46 kg/m²          Physical Exam   Constitutional: He is oriented to person, place, and time  He appears well-developed and well-nourished  No distress  Cardiovascular: Normal rate, regular rhythm, normal heart sounds and intact distal pulses  Pulmonary/Chest: Effort normal and breath sounds normal  No stridor  No respiratory distress  He has no wheezes  He has no rales  Musculoskeletal: Normal range of motion  He exhibits edema  He exhibits no tenderness or deformity  Slightly swollen toes of right foot,  non-tender to palpation  No erythema noted  Neurological: He is alert and oriented to person, place, and time  Skin: He is not diaphoretic  Nursing note and vitals reviewed

## 2019-10-22 ENCOUNTER — CONSULT (OUTPATIENT)
Dept: UROLOGY | Facility: HOSPITAL | Age: 49
End: 2019-10-22
Payer: COMMERCIAL

## 2019-10-22 VITALS
HEIGHT: 65 IN | DIASTOLIC BLOOD PRESSURE: 78 MMHG | SYSTOLIC BLOOD PRESSURE: 128 MMHG | HEART RATE: 68 BPM | WEIGHT: 151 LBS | BODY MASS INDEX: 25.16 KG/M2

## 2019-10-22 DIAGNOSIS — Z30.09 VASECTOMY EVALUATION: Primary | ICD-10-CM

## 2019-10-22 PROCEDURE — 99244 OFF/OP CNSLTJ NEW/EST MOD 40: CPT | Performed by: UROLOGY

## 2019-10-22 RX ORDER — CEPHALEXIN 500 MG/1
1000 CAPSULE ORAL ONCE
Qty: 2 CAPSULE | Refills: 0 | Status: SHIPPED | OUTPATIENT
Start: 2019-10-22 | End: 2019-10-22

## 2019-10-22 RX ORDER — ATORVASTATIN CALCIUM 20 MG/1
TABLET, FILM COATED ORAL
Refills: 3 | COMMUNITY
Start: 2019-09-06

## 2019-10-22 RX ORDER — LORAZEPAM 2 MG/1
2 TABLET ORAL ONCE
Qty: 1 TABLET | Refills: 0 | Status: SHIPPED | OUTPATIENT
Start: 2019-10-22 | End: 2019-10-22

## 2019-10-22 NOTE — PROGRESS NOTES
Assessment/Plan:    Vasectomy evaluation    Overall impression: Desires sterility    We discussed the risks of vasectomy including but not limited to infection, bleeding, damage to testicles, and possible continued fertility  We discussed that reversal is expensive, out-of-pocket and successful 80% of the time  The patient to proceed  Prescription for Ativan and Keflex given  Patient consented for procedure  He will need a ride home on the day of procedure  Procedure scheduled for the near future  Diagnoses and all orders for this visit:    Vasectomy evaluation  -     Ambulatory referral to Urology  -     LORazepam (ATIVAN) 2 mg tablet; Take 1 tablet (2 mg total) by mouth once for 1 dose Take 1 hour prior to procedure  -     cephalexin (KEFLEX) 500 mg capsule; Take 2 capsules (1,000 mg total) by mouth once for 1 dose Take both pills 1 hour prior to procedure    Other orders  -     atorvastatin (LIPITOR) 20 mg tablet; TAKE 1 2 (ONE HALF) TABLET BY MOUTH EVERY OTHER DAY          Total visit time was 60 minutes of which over 50% was spent on counseling  Subjective:     Patient ID: Favian Pandey is a 50 y o  male    19-year-old male presents desiring sterility  He is  with 2 children  He denies any significant medical problems  He denies any allergies to medications  He denies any problems coagulation  He has no other complaints  The following portions of the patient's history were reviewed and updated as appropriate: allergies, current medications, past family history, past medical history, past social history, past surgical history and problem list     Review of Systems   Constitutional: Negative  HENT: Negative  Eyes: Negative  Respiratory: Negative  Cardiovascular: Negative  Gastrointestinal: Negative  Endocrine: Negative  Genitourinary:        As noted per HPI   Musculoskeletal: Negative  Skin: Negative  Allergic/Immunologic: Negative      Neurological: Negative  Hematological: Negative  Psychiatric/Behavioral: Negative  Objective:    Physical Exam   Constitutional: He is oriented to person, place, and time  He appears well-developed and well-nourished  Neck: Normal range of motion  Cardiovascular: Intact distal pulses  Pulmonary/Chest: Effort normal    Abdominal: Soft  Bowel sounds are normal  He exhibits no distension and no mass  There is no tenderness  There is no rebound and no guarding  Genitourinary:   Genitourinary Comments: Vasa palpable bilaterally   Musculoskeletal: Normal range of motion  Neurological: He is alert and oriented to person, place, and time  Skin: Skin is warm and dry  Psychiatric: He has a normal mood and affect  Vitals reviewed          Results  Lab Results   Component Value Date    PSA 0 5 01/14/2019     Lab Results   Component Value Date    CALCIUM 9 3 01/05/2017     01/05/2017    K 4 1 06/07/2019    CO2 24 06/07/2019    CL 99 06/07/2019    BUN 13 06/07/2019    CREATININE 1 09 06/07/2019     Lab Results   Component Value Date    WBC 6 2 01/14/2019    HGB 16 2 01/14/2019    HCT 46 6 01/14/2019    MCV 97 01/14/2019     01/14/2019       No results found for this or any previous visit (from the past 1 hour(s)) ]

## 2019-12-27 ENCOUNTER — PROCEDURE VISIT (OUTPATIENT)
Dept: UROLOGY | Facility: HOSPITAL | Age: 49
End: 2019-12-27
Payer: COMMERCIAL

## 2019-12-27 VITALS
SYSTOLIC BLOOD PRESSURE: 118 MMHG | BODY MASS INDEX: 25.16 KG/M2 | HEART RATE: 82 BPM | WEIGHT: 151 LBS | HEIGHT: 65 IN | DIASTOLIC BLOOD PRESSURE: 72 MMHG

## 2019-12-27 DIAGNOSIS — Z30.09 VASECTOMY EVALUATION: Primary | ICD-10-CM

## 2019-12-27 PROCEDURE — 55250 REMOVAL OF SPERM DUCT(S): CPT | Performed by: UROLOGY

## 2019-12-27 PROCEDURE — 88302 TISSUE EXAM BY PATHOLOGIST: CPT | Performed by: PATHOLOGY

## 2019-12-27 RX ORDER — HYDROCODONE BITARTRATE AND ACETAMINOPHEN 5; 325 MG/1; MG/1
1 TABLET ORAL EVERY 4 HOURS PRN
Qty: 10 TABLET | Refills: 0 | Status: SHIPPED | OUTPATIENT
Start: 2019-12-27 | End: 2020-01-06

## 2019-12-27 NOTE — PROGRESS NOTES
Vasectomy     Date/Time 12/27/2019 12:00 PM     Performed by  Dena Mooney MD     Authorized by Dena Mooney MD            No Scalpel Vasectomy Procedure Note    Indications: 52 y o   y o  male desiring permanent sterilization    Pre-operative Diagnosis: Undesired fertility    Post-operative Diagnosis: Undesired fertility    Anesthesia: Lidocaine 2% without epinephrine      Procedure Details     The risks and benefits of the procedure were discussed at the pre-procedure consultation, and written, informed consent obtained  The patient took Ativan 2 mg andKeflex 60 minutes prior to procedure  The scrotum was palpated with both testes normal in size and position, no masses palpitated  The scrotum was cleansed with warm Betadine and draped in the usual sterile manner  2% lidocaine was instilled subcutaneously in the left hemiscrotum  After adequate anesthesia was established, a small perforation was made in the skin and the left vas was isolated with the ring forceps, dissected free and delivered through the skin perforation  The sheath of the vas was anesthetized with 2% lidocaine as well  The left vas was divided, approximately 1 5 cm portion removed, and each end of the vas was cauterized and suture ligated  The ends of the vas were replaced in the scrotum through the puncture site  The skin was reapproximated with an interrupted 3-0 chromic suture  Vasectomy was then performed on the right side in identical fashion  Midportions removed were sent to pathology to confirm  Good hemostasis was noted at the end of the procedure  Specimen:   1  Right vas deferens  2  Left vas deferens    Condition: Stable    Complications: none    Plan:        He did very well with the procedure today  Postprocedural instructions were provided  He will follow-up in 2-3 weeks for postoperative assessment   At that time we will coordinate his postprocedural semen analyses at 6 weeks, and again it 8 weeks after the procedure  He was instructed to call my office 2 weeks after his second specimen is submitted to review the results by phone  He was instructed to continue his current methods of contraception until that time

## 2020-01-15 ENCOUNTER — OFFICE VISIT (OUTPATIENT)
Dept: UROLOGY | Facility: HOSPITAL | Age: 50
End: 2020-01-15

## 2020-01-15 VITALS
DIASTOLIC BLOOD PRESSURE: 72 MMHG | HEART RATE: 82 BPM | BODY MASS INDEX: 25.16 KG/M2 | HEIGHT: 65 IN | WEIGHT: 151 LBS | SYSTOLIC BLOOD PRESSURE: 120 MMHG

## 2020-01-15 DIAGNOSIS — Z98.52 STATUS POST VASECTOMY: Primary | ICD-10-CM

## 2020-01-15 PROCEDURE — 99024 POSTOP FOLLOW-UP VISIT: CPT | Performed by: NURSE PRACTITIONER

## 2020-01-15 NOTE — PROGRESS NOTES
1/15/2020  Brenda Cruz is a 52 y o  male    Assessment/Plan  William Culver was seen today for vasectomy  Diagnoses and all orders for this visit:    Status post vasectomy  -     Semen analysis, post-vasectomy; Future  -     Semen analysis, post-vasectomy; Future        Discussion  Bernda Cruz is a 52 y o  male being managed by Dr Krupa Gray  The patient is doing well with no complaints  He was provided verbal and written instructions regarding semen analysis testing  He was instructed to use contraception until sterility confirmed with 2 semen analysis  He will call to review results  He will follow up on an as needed basis  Recommend starting routine prostate cancer screening at age 54 years  All questions were answered  History of Present Illness  52 y o  male s/p vasectomy (12/27/19), presents today for follow up  He denies any scrotal pain or swelling  He is doing well with no issues after surgery  Patient denies any strong family history of prostate cancer        Vitals  Vitals:    01/15/20 0828   BP: 120/72   BP Location: Left arm   Patient Position: Sitting   Cuff Size: Adult   Pulse: 82   Weight: 68 5 kg (151 lb)   Height: 5' 5" (1 651 m)       Current Medications  Current Outpatient Medications   Medication Sig Dispense Refill    allopurinol (ZYLOPRIM) 100 mg tablet Take 2 tablets (200 mg total) by mouth daily 60 tablet 5    atorvastatin (LIPITOR) 10 mg tablet Take 1 tablet (10 mg total) by mouth every other day 30 tablet 3    atorvastatin (LIPITOR) 20 mg tablet TAKE 1 2 (ONE HALF) TABLET BY MOUTH EVERY OTHER DAY  3    cetirizine (ZyrTEC) 10 mg tablet Take 10 mg by mouth daily      colchicine (COLCRYS) 0 6 mg tablet Take 1 tablet (0 6 mg total) by mouth daily 30 tablet 5    indomethacin (INDOCIN SR) 75 mg CR capsule Take 1 capsule (75 mg total) by mouth 2 (two) times a day with meals 60 capsule 3    LORazepam (ATIVAN) 2 mg tablet Take 1 tablet (2 mg total) by mouth once for 1 dose Take 1 hour prior to procedure  1 tablet 0     No current facility-administered medications for this visit  Review of Systems  Patient denies any gross hematuria, dysuria, or difficulty urinating      Physical Exam  Gu: scrotum midline/bilateral incisions c/d/i

## 2020-03-09 DIAGNOSIS — M25.571 ARTHRALGIA OF RIGHT FOOT: ICD-10-CM

## 2020-03-09 DIAGNOSIS — M10.9 ACUTE GOUT INVOLVING TOE OF RIGHT FOOT, UNSPECIFIED CAUSE: ICD-10-CM

## 2020-03-09 DIAGNOSIS — M25.40 JOINT SWELLING: ICD-10-CM

## 2020-03-09 RX ORDER — ALLOPURINOL 100 MG/1
TABLET ORAL
Qty: 60 TABLET | Refills: 0 | Status: SHIPPED | OUTPATIENT
Start: 2020-03-09 | End: 2020-03-11

## 2020-03-11 DIAGNOSIS — M10.9 ACUTE GOUT INVOLVING TOE OF RIGHT FOOT, UNSPECIFIED CAUSE: ICD-10-CM

## 2020-03-11 DIAGNOSIS — M25.571 ARTHRALGIA OF RIGHT FOOT: ICD-10-CM

## 2020-03-11 DIAGNOSIS — M25.40 JOINT SWELLING: ICD-10-CM

## 2020-03-11 RX ORDER — ALLOPURINOL 100 MG/1
TABLET ORAL
Qty: 60 TABLET | Refills: 0 | Status: SHIPPED | OUTPATIENT
Start: 2020-03-11 | End: 2020-03-16 | Stop reason: SDUPTHER

## 2020-03-16 DIAGNOSIS — M10.9 ACUTE GOUT INVOLVING TOE OF RIGHT FOOT, UNSPECIFIED CAUSE: ICD-10-CM

## 2020-03-16 DIAGNOSIS — M25.40 JOINT SWELLING: ICD-10-CM

## 2020-03-16 DIAGNOSIS — M25.571 ARTHRALGIA OF RIGHT FOOT: ICD-10-CM

## 2020-03-16 RX ORDER — ALLOPURINOL 100 MG/1
200 TABLET ORAL DAILY
Qty: 60 TABLET | Refills: 0 | Status: SHIPPED | OUTPATIENT
Start: 2020-03-16 | End: 2020-04-15 | Stop reason: SDUPTHER

## 2020-04-15 DIAGNOSIS — M25.40 JOINT SWELLING: ICD-10-CM

## 2020-04-15 DIAGNOSIS — M10.9 ACUTE GOUT INVOLVING TOE OF RIGHT FOOT, UNSPECIFIED CAUSE: ICD-10-CM

## 2020-04-15 DIAGNOSIS — M25.571 ARTHRALGIA OF RIGHT FOOT: ICD-10-CM

## 2020-04-15 RX ORDER — ALLOPURINOL 100 MG/1
100 TABLET ORAL DAILY
Qty: 180 TABLET | Refills: 1 | Status: SHIPPED | OUTPATIENT
Start: 2020-04-15 | End: 2020-04-17 | Stop reason: SDUPTHER

## 2020-04-16 ENCOUNTER — TELEPHONE (OUTPATIENT)
Dept: FAMILY MEDICINE CLINIC | Facility: HOSPITAL | Age: 50
End: 2020-04-16

## 2020-04-17 DIAGNOSIS — M10.9 ACUTE GOUT INVOLVING TOE OF RIGHT FOOT, UNSPECIFIED CAUSE: ICD-10-CM

## 2020-04-17 DIAGNOSIS — M25.571 ARTHRALGIA OF RIGHT FOOT: ICD-10-CM

## 2020-04-17 DIAGNOSIS — M25.40 JOINT SWELLING: ICD-10-CM

## 2020-04-17 RX ORDER — ALLOPURINOL 100 MG/1
100 TABLET ORAL 2 TIMES DAILY
Qty: 180 TABLET | Refills: 1 | Status: SHIPPED | OUTPATIENT
Start: 2020-04-17

## 2020-10-22 RX ORDER — ATORVASTATIN CALCIUM 20 MG/1
TABLET, FILM COATED ORAL
Qty: 21 TABLET | Refills: 0 | OUTPATIENT
Start: 2020-10-22

## 2021-04-05 DIAGNOSIS — Z23 ENCOUNTER FOR IMMUNIZATION: ICD-10-CM

## 2021-04-14 DIAGNOSIS — M25.571 ARTHRALGIA OF RIGHT FOOT: ICD-10-CM

## 2021-04-14 DIAGNOSIS — M25.40 JOINT SWELLING: ICD-10-CM

## 2021-04-14 DIAGNOSIS — M10.9 ACUTE GOUT INVOLVING TOE OF RIGHT FOOT, UNSPECIFIED CAUSE: ICD-10-CM

## 2021-04-15 RX ORDER — ALLOPURINOL 100 MG/1
100 TABLET ORAL 2 TIMES DAILY
Qty: 180 TABLET | Refills: 1 | OUTPATIENT
Start: 2021-04-15

## 2021-05-11 ENCOUNTER — VBI (OUTPATIENT)
Dept: ADMINISTRATIVE | Facility: OTHER | Age: 51
End: 2021-05-11

## 2023-08-08 ENCOUNTER — OFFICE VISIT (OUTPATIENT)
Age: 53
End: 2023-08-08
Payer: COMMERCIAL

## 2023-08-08 VITALS — BODY MASS INDEX: 26.79 KG/M2 | WEIGHT: 161 LBS

## 2023-08-08 DIAGNOSIS — K62.5 RECTAL BLEEDING: Primary | ICD-10-CM

## 2023-08-08 DIAGNOSIS — K62.89 HYPERTROPHIED ANAL PAPILLA: ICD-10-CM

## 2023-08-08 PROCEDURE — 46221 LIGATION OF HEMORRHOID(S): CPT | Performed by: COLON & RECTAL SURGERY

## 2023-08-08 PROCEDURE — 46600 DIAGNOSTIC ANOSCOPY SPX: CPT | Performed by: COLON & RECTAL SURGERY

## 2023-08-08 PROCEDURE — 46220 EXCISE ANAL EXT TAG/PAPILLA: CPT | Performed by: COLON & RECTAL SURGERY

## 2023-08-08 PROCEDURE — 99204 OFFICE O/P NEW MOD 45 MIN: CPT | Performed by: COLON & RECTAL SURGERY

## 2023-08-08 NOTE — PROGRESS NOTES
Colon and Rectal Surgery   Jasmyne Newberry 46 y.o. male MRN: [id number]   Encounter: [id number]  08/08/23   3:57 PM        ASSESSMENT:    Juan Donaldson is a 45yo male, he is in good health. Colonoscopy is current approximately 2 years prior, these records have been requested. Inflamed anterior midline anal tag with stigmata of recent bleeding as well as left posterior lateral engorged/prolapsing hemorrhoid. We discussed either of these could be the source of bleeding, rubber band ligature was placed on the hemorrhoid today and the tag was excised without event. PLAN:  -High fiber diet 20-30g/day with increased fruits/vegetables/psyllium(metamucil or konsyl), increased hydration noncaffeinated beverages(handouts/samples provided)  -Warm soapy water, pat dry, desitin or calmoseptine to perianal skin as barrier  -Discontinue any wet wipes or hydrocortisone  -Colonoscopy [facility] records requested  -Knows to call if any warning symptoms post banding such as excessive bleeding, fever, severe pelvic/rectal pain, urinary difficulty  -6weeks office followup recheck      HPI  Jasmyne Newberry is a 46 y.o. male is here today for evaluation of rectal pain. He states he started having persistent rectal bleeding and pain approximately 3 weeks ago after bowel movements. He contributes this to wipe trauma as symptoms have been better with use of bidet. He last noticed rectal bleeding yesterday. He also notices mucous discharge on a daily basis, staining the underwear. He has daily soft and formed bowel movements.        He denies a family history of colorectal cancer     He had a colonoscopy ~2 years ago at [facility]   Past Medical History:   Diagnosis Date   • Hyperlipidemia     Resolved 2/6/2017    • Seasonal allergies      Past Surgical History:   Procedure Laterality Date   • ELBOW SURGERY Right    • VASECTOMY         Meds/Allergies       Current Outpatient Medications:   •  allopurinol (ZYLOPRIM) 100 mg tablet, Take 1 tablet (100 mg total) by mouth 2 (two) times a day, Disp: 180 tablet, Rfl: 1  •  atorvastatin (LIPITOR) 10 mg tablet, Take 1 tablet (10 mg total) by mouth every other day, Disp: 30 tablet, Rfl: 3  •  atorvastatin (LIPITOR) 20 mg tablet, TAKE 1 2 (ONE HALF) TABLET BY MOUTH EVERY OTHER DAY, Disp: , Rfl: 3  •  cetirizine (ZyrTEC) 10 mg tablet, Take 10 mg by mouth daily, Disp: , Rfl:   •  colchicine (COLCRYS) 0.6 mg tablet, Take 1 tablet (0.6 mg total) by mouth daily, Disp: 30 tablet, Rfl: 5  •  indomethacin (INDOCIN SR) 75 mg CR capsule, Take 1 capsule (75 mg total) by mouth 2 (two) times a day with meals, Disp: 60 capsule, Rfl: 3  •  LORazepam (ATIVAN) 2 mg tablet, Take 1 tablet (2 mg total) by mouth once for 1 dose Take 1 hour prior to procedure., Disp: 1 tablet, Rfl: 0      Allergies   Allergen Reactions   • Pollen Extract    • Tree Extract          Social History   Social History     Substance and Sexual Activity   Alcohol Use Yes    Comment: social     Social History     Substance and Sexual Activity   Drug Use No     Social History     Tobacco Use   Smoking Status Some Days   • Types: Cigars   Smokeless Tobacco Never   Tobacco Comments    cigars         Family History:   Family History   Problem Relation Age of Onset   • No Known Problems Mother    • No Known Problems Father    • Substance Abuse Neg Hx    • Mental illness Neg Hx      Review of Systems    Objective   Current Vitals:   Vitals:    08/08/23 1547   Weight: 73 kg (161 lb)     Physical Exam:  General:no distress  Eyes:perrla/eomi  ENT:moist mucus membranes  Neck:supple  Pulm:no increased work of breathing  CV:sinus  Abdomen:soft,nontender  Rectal:normal perianal skin/sphincter tone, no masses palpated  Extremities:no edema    Anoscopy    Date/Time: 8/8/2023 3:55 PM    Performed by: Poly Morel MD  Authorized by: Poly Morel MD    Verbal consent obtained?: Yes    Consent given by:  Patient  Patient identity confirmed: Verbally with patient  Indications: rectal bleeding    Scope type: Anoscope   Prolapsing internal hemorrhoid left posterolateral position. Anal Procedures    Performed by: Pita Burrows MD  Authorized by: Pita Burrows MD    Universal Protocol:     Verbal consent obtained?: Yes      Consent given by:  Patient    Patient states understanding of procedure being performed: Yes      Patient identity confirmed:  Verbally with patient  A time out verifies correct patient, procedure, equipment, support staff and site/side marked as required:   Procedure Type: hemorrhoidectomy and excision of single external papillae or tag, anus    Patient Position:  Jackknife  Hemorrhoidectomy Details:   Hemorrhoid type: internal    Single rubber band ligation    Excision Details:   Destruction method: surgical removal    Additiional Procedure Intervention Details:  After verbal consent obtained in a face-to-face, personal, informed consent process, the benefits, alternatives, risks, anoscope introduced and largest complex of internal hemorrhoidal engorgement in left posterolateral position grasped, confirmed insensate, and rubber band ligature placed x 1 without event or pain. Then perianal betadine prep, 5mL lidocaine/epi local block, anesthesia confirmed, excision of inflamed anterior midline anal tag with metzenbaum scissors, superficial and well away from sphincter complex, single 4-0 vicryl figure of eight suture placed to coapt edges, well tolerated, hemostatic, pathology sent, gauze placed. I have spent a total time of 45 minutes on 08/09/23 in caring for this patient including Diagnostic results, Risks and benefits of tx options, Counseling / Coordination of care, Documenting in the medical record, Reviewing / ordering tests, medicine, procedures   and Communicating with other healthcare professionals .

## 2023-08-09 PROCEDURE — 88341 IMHCHEM/IMCYTCHM EA ADD ANTB: CPT | Performed by: PATHOLOGY

## 2023-08-09 PROCEDURE — 88304 TISSUE EXAM BY PATHOLOGIST: CPT | Performed by: PATHOLOGY

## 2023-08-09 PROCEDURE — 88342 IMHCHEM/IMCYTCHM 1ST ANTB: CPT | Performed by: PATHOLOGY

## 2023-08-09 NOTE — PATIENT INSTRUCTIONS
ASSESSMENT:    Dashawn Blum is a 47yo male, he is in good health. Colonoscopy is current approximately 2 years prior, these records have been requested. Inflamed anterior midline anal tag with stigmata of recent bleeding as well as left posterior lateral engorged/prolapsing hemorrhoid. We discussed either of these could be the source of bleeding, rubber band ligature was placed on the hemorrhoid today and the tag was excised without event.     PLAN:  -High fiber diet 20-30g/day with increased fruits/vegetables/psyllium(metamucil or konsyl), increased hydration noncaffeinated beverages(handouts/samples provided)  -Warm soapy water, pat dry, desitin or calmoseptine to perianal skin as barrier  -Discontinue any wet wipes or hydrocortisone  -Colonoscopy [facility] records requested  -Knows to call if any warning symptoms post banding such as excessive bleeding, fever, severe pelvic/rectal pain, urinary difficulty  -6weeks office followup recheck

## 2023-08-15 PROCEDURE — 88341 IMHCHEM/IMCYTCHM EA ADD ANTB: CPT | Performed by: PATHOLOGY

## 2023-08-15 PROCEDURE — 88342 IMHCHEM/IMCYTCHM 1ST ANTB: CPT | Performed by: PATHOLOGY

## 2023-08-15 PROCEDURE — 88304 TISSUE EXAM BY PATHOLOGIST: CPT | Performed by: PATHOLOGY

## 2025-07-30 PROBLEM — J45.20 MILD INTERMITTENT ASTHMA: Status: ACTIVE | Noted: 2025-07-30

## 2025-07-30 PROBLEM — M1A.00X0 IDIOPATHIC CHRONIC GOUT, UNSPECIFIED SITE, WITHOUT TOPHUS (TOPHI): Status: ACTIVE | Noted: 2025-07-30

## 2025-07-30 PROBLEM — E78.2 MIXED HYPERLIPIDEMIA: Status: ACTIVE | Noted: 2025-07-30

## 2025-08-04 RX ORDER — ICOSAPENT ETHYL 1 G/1
2 CAPSULE ORAL 2 TIMES DAILY WITH MEALS
COMMUNITY
Start: 2025-07-08

## 2025-08-04 RX ORDER — ROSUVASTATIN CALCIUM 10 MG/1
10 TABLET, COATED ORAL DAILY
COMMUNITY
Start: 2018-08-04